# Patient Record
Sex: FEMALE | Race: WHITE | Employment: FULL TIME | URBAN - METROPOLITAN AREA
[De-identification: names, ages, dates, MRNs, and addresses within clinical notes are randomized per-mention and may not be internally consistent; named-entity substitution may affect disease eponyms.]

---

## 2023-08-16 DIAGNOSIS — Z94.0 KIDNEY REPLACED BY TRANSPLANT: ICD-10-CM

## 2023-08-23 ENCOUNTER — HOSPITAL ENCOUNTER (OUTPATIENT)
Dept: PULMONOLOGY | Age: 58
Discharge: HOME | End: 2023-08-23
Payer: COMMERCIAL

## 2023-08-23 DIAGNOSIS — Z94.0 KIDNEY REPLACED BY TRANSPLANT: Primary | ICD-10-CM

## 2023-08-23 DIAGNOSIS — Z94.0 KIDNEY TRANSPLANT RECIPIENT: ICD-10-CM

## 2023-08-23 DIAGNOSIS — Z29.8 NEED FOR PNEUMOCYSTIS PROPHYLAXIS: ICD-10-CM

## 2023-08-23 PROCEDURE — 6360000002 HC RX W HCPCS: Performed by: INTERNAL MEDICINE

## 2023-08-23 PROCEDURE — 94642 AEROSOL INHALATION TREATMENT: CPT

## 2023-08-23 RX ORDER — PENTAMIDINE ISETHIONATE 300 MG/300MG
300 INHALANT RESPIRATORY (INHALATION) ONCE
Status: DISCONTINUED | OUTPATIENT
Start: 2023-09-20 | End: 2023-08-23 | Stop reason: HOSPADM

## 2023-08-23 RX ORDER — PENTAMIDINE ISETHIONATE 300 MG/300MG
300 INHALANT RESPIRATORY (INHALATION) ONCE
Status: COMPLETED | OUTPATIENT
Start: 2023-08-23 | End: 2023-08-23

## 2023-08-23 RX ADMIN — PENTAMIDINE ISETHIONATE 300 MG: 300 INHALANT RESPIRATORY (INHALATION) at 09:18

## 2024-05-10 ENCOUNTER — APPOINTMENT (OUTPATIENT)
Dept: GENERAL RADIOLOGY | Age: 59
End: 2024-05-10
Payer: COMMERCIAL

## 2024-05-10 ENCOUNTER — HOSPITAL ENCOUNTER (EMERGENCY)
Age: 59
Discharge: HOME | End: 2024-05-10
Payer: COMMERCIAL

## 2024-05-10 VITALS
WEIGHT: 123 LBS | HEART RATE: 65 BPM | DIASTOLIC BLOOD PRESSURE: 76 MMHG | HEIGHT: 63 IN | OXYGEN SATURATION: 99 % | RESPIRATION RATE: 16 BRPM | SYSTOLIC BLOOD PRESSURE: 123 MMHG | BODY MASS INDEX: 21.79 KG/M2 | TEMPERATURE: 98.1 F

## 2024-05-10 DIAGNOSIS — S93.401A MODERATE RIGHT ANKLE SPRAIN, INITIAL ENCOUNTER: Primary | ICD-10-CM

## 2024-05-10 PROCEDURE — 73610 X-RAY EXAM OF ANKLE: CPT

## 2024-05-10 PROCEDURE — 99283 EMERGENCY DEPT VISIT LOW MDM: CPT

## 2024-05-10 RX ORDER — IBUPROFEN 800 MG/1
800 TABLET ORAL EVERY 8 HOURS PRN
Qty: 30 TABLET | Refills: 0 | Status: SHIPPED | OUTPATIENT
Start: 2024-05-10

## 2024-05-10 RX ORDER — GINSENG 100 MG
50 CAPSULE ORAL DAILY
COMMUNITY

## 2024-05-10 RX ORDER — ASPIRIN 81 MG/1
81 TABLET ORAL DAILY
COMMUNITY

## 2024-05-10 RX ORDER — MYCOPHENOLIC ACID 180 MG/1
360 TABLET, DELAYED RELEASE ORAL 2 TIMES DAILY
COMMUNITY

## 2024-05-10 RX ORDER — CARVEDILOL 12.5 MG/1
12.5 TABLET ORAL 2 TIMES DAILY WITH MEALS
COMMUNITY

## 2024-05-10 RX ORDER — PANTOPRAZOLE SODIUM 40 MG/1
80 TABLET, DELAYED RELEASE ORAL DAILY
COMMUNITY

## 2024-05-10 RX ORDER — FEBUXOSTAT 40 MG/1
40 TABLET, FILM COATED ORAL DAILY
COMMUNITY

## 2024-05-10 RX ORDER — TACROLIMUS 1 MG/1
3 CAPSULE ORAL 2 TIMES DAILY
COMMUNITY

## 2024-05-10 RX ORDER — SIMVASTATIN 40 MG
40 TABLET ORAL NIGHTLY
COMMUNITY

## 2024-05-10 RX ORDER — PREDNISONE 5 MG/1
5 TABLET ORAL DAILY
COMMUNITY

## 2024-05-10 RX ORDER — OXYCODONE HYDROCHLORIDE AND ACETAMINOPHEN 5; 325 MG/1; MG/1
1 TABLET ORAL EVERY 6 HOURS PRN
Qty: 12 TABLET | Refills: 0 | Status: SHIPPED | OUTPATIENT
Start: 2024-05-10 | End: 2024-05-13

## 2024-05-10 ASSESSMENT — PAIN DESCRIPTION - ONSET: ONSET: SUDDEN

## 2024-05-10 ASSESSMENT — PAIN DESCRIPTION - PAIN TYPE: TYPE: ACUTE PAIN

## 2024-05-10 ASSESSMENT — PAIN DESCRIPTION - LOCATION: LOCATION: ANKLE

## 2024-05-10 ASSESSMENT — PAIN SCALES - GENERAL: PAINLEVEL_OUTOF10: 8

## 2024-05-10 ASSESSMENT — PAIN DESCRIPTION - DESCRIPTORS: DESCRIPTORS: THROBBING;DISCOMFORT;ACHING

## 2024-05-10 ASSESSMENT — PAIN DESCRIPTION - ORIENTATION: ORIENTATION: RIGHT

## 2024-05-10 ASSESSMENT — PAIN - FUNCTIONAL ASSESSMENT: PAIN_FUNCTIONAL_ASSESSMENT: 0-10

## 2024-05-10 ASSESSMENT — PAIN DESCRIPTION - FREQUENCY: FREQUENCY: CONTINUOUS

## 2024-05-10 NOTE — DISCHARGE INSTRUCTIONS
XR ANKLE RIGHT (MIN 3 VIEWS)   Final Result   Probable degenerative or remote posttraumatic change medial malleolus given   the lack of significant soft tissue swelling and predominant soft tissue   swelling laterally.  Clinical correlation however is recommended.      Lateral ankle edema but no lateral ankle fracture.      Calcaneal enthesopathy.              Continue rest, ice, elevation.  Ibuprofen every 8 hours as needed for pain.  Percocet every 6 hours as needed for severe pain.  If your symptoms do not improve with conservative treatment over the next 1 to 2 weeks please follow-up with podiatry

## 2024-05-12 NOTE — ED PROVIDER NOTES
SPLINT  APPLICATION  Risks, benefits and alternatives (for applicable procedures below) described.   Performed By: Nursing staff.    Indication:  strain of right ankle.  Procedure:   An Ace wrap and Aircast splint was applied to patient's right ankle.  Patient remained neurovascularly intact following splint application       MDM:      Briefly this is a 58-year-old female patient is presenting with a 1 week history of a right lateral ankle pain after twisting her right ankle.  Had a previous fracture in this ankle many years ago.  Did not require surgery.  Is having ongoing pain.  On exam patient is neurovascular intact, cap refill is less than 2 seconds, she is warm well-perfused, pedal pulses are intact, there is moderate tenderness noted over the right lateral malleolus, range of motion is intact.  Plain film x-rays were obtained and independently reviewed by myself, no evidence of acute fracture or traumatic malalignment.  Discussed with patient that hairline fractures may not be visible on plain film x-rays.  I have offered to place patient in Aircast and Ace wrap and provide crutches.  She is willing to use the Aircast and Ace but does not want the crutches.  States that she just prefers to ambulate.  Advised rest, ice, elevation.  Ibuprofen every 6 hours as needed for pain.  Advise follow-up podiatry if symptoms do not improve.  Discussed tricked return to ER precautions    Patient was explicitly instructed on specific signs and symptoms on which to return to the emergency room for. Patient was instructed to return to the ER for any new or worsening symptoms. Additional discharge instructions were given verbally. All questions were answered. Patient is comfortable and agreeable with discharge plan. Patient in no acute distress and non-toxic in appearance.    Plan of Care/Counseling:  NATALIE Marie - CNP reviewed today's visit with the patient in addition to providing specific details for the